# Patient Record
Sex: FEMALE | Race: BLACK OR AFRICAN AMERICAN
[De-identification: names, ages, dates, MRNs, and addresses within clinical notes are randomized per-mention and may not be internally consistent; named-entity substitution may affect disease eponyms.]

---

## 2018-02-18 ENCOUNTER — HOSPITAL ENCOUNTER (EMERGENCY)
Dept: HOSPITAL 62 - ER | Age: 28
Discharge: STILL ADMITTED OR ADMINISTRATIVE | End: 2018-02-18
Payer: MEDICAID

## 2018-02-18 ENCOUNTER — HOSPITAL ENCOUNTER (INPATIENT)
Dept: HOSPITAL 62 - LC | Age: 28
LOS: 3 days | Discharge: HOME | End: 2018-02-21
Attending: OBSTETRICS & GYNECOLOGY | Admitting: OBSTETRICS & GYNECOLOGY
Payer: MEDICAID

## 2018-02-18 VITALS — DIASTOLIC BLOOD PRESSURE: 67 MMHG | SYSTOLIC BLOOD PRESSURE: 129 MMHG

## 2018-02-18 DIAGNOSIS — N85.8: ICD-10-CM

## 2018-02-18 DIAGNOSIS — O16.3: ICD-10-CM

## 2018-02-18 DIAGNOSIS — Z3A.36: ICD-10-CM

## 2018-02-18 DIAGNOSIS — F32.9: ICD-10-CM

## 2018-02-18 DIAGNOSIS — O99.89: ICD-10-CM

## 2018-02-18 DIAGNOSIS — O34.211: Primary | ICD-10-CM

## 2018-02-18 DIAGNOSIS — O99.343: Primary | ICD-10-CM

## 2018-02-18 DIAGNOSIS — N73.6: ICD-10-CM

## 2018-02-18 DIAGNOSIS — Z3A.00: ICD-10-CM

## 2018-02-18 DIAGNOSIS — Z30.2: ICD-10-CM

## 2018-02-18 DIAGNOSIS — J45.909: ICD-10-CM

## 2018-02-18 LAB
A1 MICROGLOB PLACENTAL VAG QL: POSITIVE
ADD MANUAL DIFF: NO
ALBUMIN SERPL-MCNC: 3.9 G/DL (ref 3.5–5)
ALP SERPL-CCNC: 149 U/L (ref 38–126)
ALT SERPL-CCNC: 23 U/L (ref 9–52)
ANION GAP SERPL CALC-SCNC: 11 MMOL/L (ref 5–19)
APAP SERPL-MCNC: < 10 UG/ML (ref 10–30)
APPEARANCE UR: (no result)
APPEARANCE UR: CLEAR
APTT PPP: YELLOW S
APTT PPP: YELLOW S
AST SERPL-CCNC: 17 U/L (ref 14–36)
BARBITURATES UR QL SCN: NEGATIVE
BARBITURATES UR QL SCN: NEGATIVE
BASOPHILS # BLD AUTO: 0 10^3/UL (ref 0–0.2)
BASOPHILS NFR BLD AUTO: 0.2 % (ref 0–2)
BILIRUB DIRECT SERPL-MCNC: 0.3 MG/DL (ref 0–0.4)
BILIRUB SERPL-MCNC: 0.3 MG/DL (ref 0.2–1.3)
BILIRUB UR QL STRIP: NEGATIVE
BILIRUB UR QL STRIP: NEGATIVE
BUN SERPL-MCNC: 7 MG/DL (ref 7–20)
CALCIUM: 9.8 MG/DL (ref 8.4–10.2)
CHLORIDE SERPL-SCNC: 105 MMOL/L (ref 98–107)
CO2 SERPL-SCNC: 21 MMOL/L (ref 22–30)
EOSINOPHIL # BLD AUTO: 0.1 10^3/UL (ref 0–0.6)
EOSINOPHIL NFR BLD AUTO: 0.6 % (ref 0–6)
ERYTHROCYTE [DISTWIDTH] IN BLOOD BY AUTOMATED COUNT: 15.7 % (ref 11.5–14)
ETHANOL SERPL-MCNC: < 10 MG/DL
GLUCOSE SERPL-MCNC: 89 MG/DL (ref 75–110)
GLUCOSE UR STRIP-MCNC: NEGATIVE MG/DL
GLUCOSE UR STRIP-MCNC: NEGATIVE MG/DL
HCT VFR BLD CALC: 36.9 % (ref 36–47)
HGB BLD-MCNC: 12.1 G/DL (ref 12–15.5)
KETONES UR STRIP-MCNC: (no result) MG/DL
KETONES UR STRIP-MCNC: 20 MG/DL
LDH1 SERPL-CCNC: 401 U/L (ref 313–618)
LYMPHOCYTES # BLD AUTO: 2.5 10^3/UL (ref 0.5–4.7)
LYMPHOCYTES NFR BLD AUTO: 26.5 % (ref 13–45)
MCH RBC QN AUTO: 26.6 PG (ref 27–33.4)
MCHC RBC AUTO-ENTMCNC: 32.8 G/DL (ref 32–36)
MCV RBC AUTO: 81 FL (ref 80–97)
METHADONE UR QL SCN: NEGATIVE
METHADONE UR QL SCN: NEGATIVE
MONOCYTES # BLD AUTO: 0.5 10^3/UL (ref 0.1–1.4)
MONOCYTES NFR BLD AUTO: 5.5 % (ref 3–13)
NEUTROPHILS # BLD AUTO: 6.2 10^3/UL (ref 1.7–8.2)
NEUTS SEG NFR BLD AUTO: 67.2 % (ref 42–78)
NITRITE UR QL STRIP: NEGATIVE
NITRITE UR QL STRIP: NEGATIVE
PCP UR QL SCN: NEGATIVE
PCP UR QL SCN: NEGATIVE
PH UR STRIP: 6 [PH] (ref 5–9)
PH UR STRIP: 6 [PH] (ref 5–9)
PLATELET # BLD: 221 10^3/UL (ref 150–450)
POTASSIUM SERPL-SCNC: 3.9 MMOL/L (ref 3.6–5)
PROT SERPL-MCNC: 7 G/DL (ref 6.3–8.2)
PROT UR STRIP-MCNC: 30 MG/DL
PROT UR STRIP-MCNC: 30 MG/DL
RBC # BLD AUTO: 4.55 10^6/UL (ref 3.72–5.28)
RUBELLA INTERPRETATION: POSITIVE
RUBV IGG SER-ACNC: 14 IU/ML
SALICYLATES SERPL-MCNC: < 1 MG/DL (ref 2–20)
SODIUM SERPL-SCNC: 136.5 MMOL/L (ref 137–145)
SP GR UR STRIP: 1.03
SP GR UR STRIP: 1.03
TOTAL CELLS COUNTED % (AUTO): 100 %
URATE SERPL-MCNC: 4.2 MG/DL (ref 2.5–6.2)
URINE AMPHETAMINES SCREEN: NEGATIVE
URINE AMPHETAMINES SCREEN: NEGATIVE
URINE BENZODIAZEPINES SCREEN: NEGATIVE
URINE BENZODIAZEPINES SCREEN: NEGATIVE
URINE COCAINE SCREEN: NEGATIVE
URINE COCAINE SCREEN: NEGATIVE
URINE MARIJUANA (THC) SCREEN: NEGATIVE
URINE MARIJUANA (THC) SCREEN: NEGATIVE
UROBILINOGEN UR-MCNC: NEGATIVE MG/DL (ref ?–2)
UROBILINOGEN UR-MCNC: NEGATIVE MG/DL (ref ?–2)
WBC # BLD AUTO: 9.3 10^3/UL (ref 4–10.5)

## 2018-02-18 PROCEDURE — 99284 EMERGENCY DEPT VISIT MOD MDM: CPT

## 2018-02-18 PROCEDURE — 93005 ELECTROCARDIOGRAM TRACING: CPT

## 2018-02-18 PROCEDURE — 87340 HEPATITIS B SURFACE AG IA: CPT

## 2018-02-18 PROCEDURE — 86592 SYPHILIS TEST NON-TREP QUAL: CPT

## 2018-02-18 PROCEDURE — 81001 URINALYSIS AUTO W/SCOPE: CPT

## 2018-02-18 PROCEDURE — 80053 COMPREHEN METABOLIC PANEL: CPT

## 2018-02-18 PROCEDURE — 80307 DRUG TEST PRSMV CHEM ANLYZR: CPT

## 2018-02-18 PROCEDURE — 94799 UNLISTED PULMONARY SVC/PX: CPT

## 2018-02-18 PROCEDURE — 86901 BLOOD TYPING SEROLOGIC RH(D): CPT

## 2018-02-18 PROCEDURE — 88307 TISSUE EXAM BY PATHOLOGIST: CPT

## 2018-02-18 PROCEDURE — 84443 ASSAY THYROID STIM HORMONE: CPT

## 2018-02-18 PROCEDURE — 87591 N.GONORRHOEAE DNA AMP PROB: CPT

## 2018-02-18 PROCEDURE — 85027 COMPLETE CBC AUTOMATED: CPT

## 2018-02-18 PROCEDURE — 81005 URINALYSIS: CPT

## 2018-02-18 PROCEDURE — C1765 ADHESION BARRIER: HCPCS

## 2018-02-18 PROCEDURE — 36415 COLL VENOUS BLD VENIPUNCTURE: CPT

## 2018-02-18 PROCEDURE — 84112 EVAL AMNIOTIC FLUID PROTEIN: CPT

## 2018-02-18 PROCEDURE — 83615 LACTATE (LD) (LDH) ENZYME: CPT

## 2018-02-18 PROCEDURE — 93010 ELECTROCARDIOGRAM REPORT: CPT

## 2018-02-18 PROCEDURE — 86762 RUBELLA ANTIBODY: CPT

## 2018-02-18 PROCEDURE — 86804 HEP C AB TEST CONFIRM: CPT

## 2018-02-18 PROCEDURE — 86701 HIV-1ANTIBODY: CPT

## 2018-02-18 PROCEDURE — 0U570ZZ DESTRUCTION OF BILATERAL FALLOPIAN TUBES, OPEN APPROACH: ICD-10-PCS | Performed by: OBSTETRICS & GYNECOLOGY

## 2018-02-18 PROCEDURE — 84550 ASSAY OF BLOOD/URIC ACID: CPT

## 2018-02-18 PROCEDURE — 86850 RBC ANTIBODY SCREEN: CPT

## 2018-02-18 PROCEDURE — 87491 CHLMYD TRACH DNA AMP PROBE: CPT

## 2018-02-18 PROCEDURE — 85025 COMPLETE CBC W/AUTO DIFF WBC: CPT

## 2018-02-18 PROCEDURE — 4A1HXCZ MONITORING OF PRODUCTS OF CONCEPTION, CARDIAC RATE, EXTERNAL APPROACH: ICD-10-PCS | Performed by: OBSTETRICS & GYNECOLOGY

## 2018-02-18 PROCEDURE — 86803 HEPATITIS C AB TEST: CPT

## 2018-02-18 PROCEDURE — 76815 OB US LIMITED FETUS(S): CPT

## 2018-02-18 PROCEDURE — 86900 BLOOD TYPING SEROLOGIC ABO: CPT

## 2018-02-18 NOTE — WARNING SIGNS IN BABIES
=================================================================

VOD Warning Signs

=================================================================

Datetime Report Generated by [facility]: 02/18/2018 23:14

   

VOD#608 -Warning Signs in Babies:  Needs to be viewed.    (02/18/2018

   19:24:Yumiko Giordano RN)

## 2018-02-18 NOTE — ER DOCUMENT REPORT
ED Psych Disorder / Suicide





- General


Chief Complaint: Psych Problem


Stated Complaint: PSYCH EVAL


Time Seen by Provider: 18 17:13


Mode of Arrival: Ambulatory


TRAVEL OUTSIDE OF THE [place] IN LAST 30 DAYS: No





- HPI


Patient complains to provider of: Other - DEPRESSION


Onset: This afternoon


Onset was: Sudden


Quality of pain: No pain


Suicide Risk Factors: Lack of social support


Situational problems related to: Spouse, Other - PREGNANCY


Normal mood: No - TEARFUL, DEPRESSED


Associated symptoms: Depressed, Tearful


Similar symptoms previously: Yes


Recently seen / treated by doctor: Yes - ROUTINE PRENATAL CARE


Notes: 





This patient apparently was at this emergency department earlier today and 

dropped off her 14-month-old child unattended and then left.  Subsequently she 

returned and requested mental health assistance.





- Related Data


Allergies/Adverse Reactions: 


 





No Known Allergies Allergy (Unverified 18 16:49)


 











Past Medical History





- General


Information source: Patient





- Social History


Smoking Status: Never Smoker


Chew tobacco use (# tins/day): No


Frequency of alcohol use: None


Drug Abuse: None


Lives with: Parents


Family History: None


Patient has suicidal ideation: No


Patient has homicidal ideation: No





- Past Medical History


Cardiac Medical History: Reports: Hx Hypertension


Pulmonary Medical History: Reports: None


EENT Medical History: Reports: None


Neurological Medical History: Reports: None


Endocrine Medical History: Reports: None


Renal/ Medical History: Reports: None.  Denies: Hx Peritoneal Dialysis


Malignancy Medical History: Reports: None


GI Medical History: Reports: None


Musculoskeltal Medical History: Reports None


Psychiatric Medical History: Reports: Hx Depression


Past Surgical History: Reports: Hx  Section





Review of Systems





- Review of Systems


Constitutional: No symptoms reported


EENT: No symptoms reported


Cardiovascular: No symptoms reported


Respiratory: No symptoms reported


Gastrointestinal: No symptoms reported


Genitourinary: No symptoms reported


Female Genitourinary: See HPI.  denies: Vaginal bleeding


Musculoskeletal: No symptoms reported


Skin: No symptoms reported


Neurological/Psychological: No symptoms reported





Physical Exam





- Vital signs


Vitals: 


 











Temp Pulse Resp BP Pulse Ox


 


 97.8 F   82   20   129/67 H  100 


 


 18 17:01  18 17:01  18 17:01  18 17:01  18 17:01











Interpretation: Normal





- General


General appearance: Appears well, Alert


In distress: None





- HEENT


Head: Normocephalic


Eyes: Normal


Conjunctiva: Normal


Ears: Normal


Nasal: Normal


Mouth/Lips: Normal


Mucous membranes: Normal


Pharynx: Normal





- Respiratory


Respiratory status: No respiratory distress


Breath sounds: Normal





- Cardiovascular


Rhythm: Regular


Heart sounds: Normal auscultation


Murmur: No





- Abdominal


Inspection: Gravid female





- Back


Back: Normal





- Extremities


General upper extremity: Normal inspection


General lower extremity: Normal inspection





- Neurological


Neuro grossly intact: Yes


Cognition: Normal


Orientation: AAOx4





- Psychological


Associated symptoms: Depressed, Tearful





- Skin


Skin Temperature: Warm


Skin Moisture: Dry


Skin Color: Normal


Skin Turgor: Elastic





Course





- Vital Signs


Vital signs: 


 











Temp Pulse Resp BP Pulse Ox


 


 97.8 F   82   20   129/67 H  100 


 


 18 17:01  18 17:01  18 17:01  18 17:01  18 17:01














- Laboratory


Result Diagrams: 


 18 17:50





 18 17:50


Laboratory results interpreted by me: 


 











  18





  17:50 17:50


 


MCH  26.6 L 


 


RDW  15.7 H 


 


Sodium   136.5 L


 


Carbon Dioxide   21 L


 


Alkaline Phosphatase   149 H


 


Salicylates   < 1.0 L


 


Acetaminophen   < 10 L














Discharge





- Discharge


Clinical Impression: 


 Third trimester pregnancy





Depression


Qualifiers:


 Depression Type: unspecified Qualified Code(s): F32.9 - Major depressive 

disorder, single episode, unspecified





Condition: Stable


Disposition: LABOR CHECK

## 2018-02-18 NOTE — RADIOLOGY REPORT (SQ)
EXAM DESCRIPTION:  U/S OB LIMITED



COMPLETED DATE/TIME:  2/18/2018 10:15 pm



REASON FOR STUDY:  unknown PNC, ?ED 3/15, EGA



COMPARISON:  None.



TECHNIQUE:  Limited transabdominal grayscale ultrasound for evaluation of specific requested obstetri
vanessa parameters.



LIMITATIONS:  None.



FINDINGS:  CECE: 8.5 cm.  Largest pocket 4.6 cm.

FHR: 113 beats per minute.

PRESENTATION: Cephalic.

OTHER: Posterior placenta without evidence of abruption.  Estimated fetal weight 3,228 g +/-4978 g.



IMPRESSION:  LIMITED OBSTETRICAL ULTRASOUND WITH MEASURED PARAMETERS DELINEATED ABOVE.

Trimester of pregnancy: Third trimester - 28 weeks to delivery.



TECHNICAL DOCUMENTATION:  JOB ID:  [id number]

 2011 [employer]- All Rights Reserved

## 2018-02-19 LAB
CHLAM PCR: NOT DETECTED
ERYTHROCYTE [DISTWIDTH] IN BLOOD BY AUTOMATED COUNT: 15.8 % (ref 11.5–14)
GON PCR: NOT DETECTED
HCT VFR BLD CALC: 31.2 % (ref 36–47)
HGB BLD-MCNC: 10.3 G/DL (ref 12–15.5)
MCH RBC QN AUTO: 26.7 PG (ref 27–33.4)
MCHC RBC AUTO-ENTMCNC: 32.9 G/DL (ref 32–36)
MCV RBC AUTO: 81 FL (ref 80–97)
PLATELET # BLD: 181 10^3/UL (ref 150–450)
RBC # BLD AUTO: 3.84 10^6/UL (ref 3.72–5.28)
WBC # BLD AUTO: 10.2 10^3/UL (ref 4–10.5)

## 2018-02-19 RX ADMIN — Medication SCH CAP: at 10:35

## 2018-02-19 RX ADMIN — IBUPROFEN SCH MG: 800 TABLET, FILM COATED ORAL at 02:28

## 2018-02-19 RX ADMIN — HYDROMORPHONE HYDROCHLORIDE PRN MG: 2 INJECTION INTRAMUSCULAR; INTRAVENOUS; SUBCUTANEOUS at 01:24

## 2018-02-19 RX ADMIN — KETOROLAC TROMETHAMINE SCH MG: 30 INJECTION, SOLUTION INTRAMUSCULAR at 10:36

## 2018-02-19 RX ADMIN — KETOROLAC TROMETHAMINE SCH MG: 30 INJECTION, SOLUTION INTRAMUSCULAR at 17:12

## 2018-02-19 RX ADMIN — DOCUSATE SODIUM SCH MG: 100 CAPSULE, LIQUID FILLED ORAL at 10:35

## 2018-02-19 RX ADMIN — IBUPROFEN SCH MG: 800 TABLET, FILM COATED ORAL at 11:00

## 2018-02-19 RX ADMIN — OXYCODONE AND ACETAMINOPHEN PRN TAB: 5; 325 TABLET ORAL at 21:26

## 2018-02-19 RX ADMIN — DOCUSATE SODIUM SCH MG: 100 CAPSULE, LIQUID FILLED ORAL at 17:12

## 2018-02-19 RX ADMIN — KETOROLAC TROMETHAMINE SCH MG: 30 INJECTION, SOLUTION INTRAMUSCULAR at 21:12

## 2018-02-19 RX ADMIN — IBUPROFEN SCH MG: 800 TABLET, FILM COATED ORAL at 06:16

## 2018-02-19 RX ADMIN — IBUPROFEN SCH MG: 800 TABLET, FILM COATED ORAL at 17:03

## 2018-02-19 RX ADMIN — HYDROMORPHONE HYDROCHLORIDE PRN MG: 2 INJECTION INTRAMUSCULAR; INTRAVENOUS; SUBCUTANEOUS at 06:50

## 2018-02-19 NOTE — DELIVERY SUMMARY
=================================================================

Del Sum A-C

=================================================================

Datetime Report Generated by CPN: 2018 02:12

   

   

=================================================================

DELIVERY PERSONNEL

=================================================================

   

DELIVERY PERSONNEL:  [id number]

Delivery Doctor::  Anamaria Ibanez MD

Anesthesiologist::  Jennifer Alicia MD

CRNA::  Antoni Castillo CRNA

Labor and Delivery Nurse::  Yumiko Giordano RN

Circulator::  Yumiko Giordano RN

 Nurse Practitioner::  VIOLETA Guillen

Nursery Nurse::  Isis Mercado RN

Scrub Tech/CNA:  ST Elaine

Scrub Tech/CNA:  Vira Mccarthy ST

   

=================================================================

MATERNAL INFORMATION

=================================================================

   

Delivery Anesthesia:  Spinal

Medications After Delivery:  Pitocin Drip 20 Units/1000ml NSS

Estimated  Blood Loss (ml):  600

Maternal Complications:  Premature Rupture of Membranes; Other

Other Maternal Complications:  ghtn, psychosocial issues, limited

   prenatal records receives care in [place], [place]

   

=================================================================

LABOR SUMMARY

=================================================================

   

EDC:  03/15/2018 00:00

No. Babies in Womb:  1

 Attempted:  No

Labor Anesthesia:  None

   

=================================================================

LABOR INFORMATION

=================================================================

   

Reason for Induction:  Not Applicable

Oxytocin:  N/A

Group B Beta Strep:  pos

Antibiotics # of Doses:  1

Name of Antibiotic Given:  penicillin

Steroids Given:  None

Reason Steroids Not Administered:  Not Applicable

   

=================================================================

MEMBRANES

=================================================================

   

Membranes Rupture Method:  Spontaneous

Rupture of Membranes:  2018 16:00

Length of Rupture (hr):  6.40

Amniotic Fluid Color:  Clear

Amniotic Fluid Amount:  Scant

Amniotic Fluid Odor:  Normal

   

=================================================================

STAGES OF LABOR

=================================================================

   

Stage 3 hr:  0

Stage 3 min:  1

   

=================================================================

VAGINAL DELIVERY

=================================================================

   

Episiotomy:  None

Laceration #1:  None

Laceration Extension #1:  N/A

Laceration Repair:  Not Applicable

   

=================================================================

CSECTION DELIVERY

=================================================================

   

Primary Indication:  > 2 Previous C-Sections

CSection Urgency:  Non-Scheduled

CSection Incidence:  Repeat

Labor:  No Labor

Elective:  Nonelective

CSection Incision:  Lower Uterine Transverse

   

=================================================================

BABY A INFORMATION

=================================================================

   

Infant Delivery Date/Time:  2018 22:24

Method of Delivery:  

Born in Route :  No

:  N/A

Forceps:  N/A

Vacuum Extraction:  N/A

Shoulder Dystocia :  No

   

=================================================================

PRESENTATION/POSITION BABY A

=================================================================

   

Presentation:  Cephalic

Cephalic Presentation:  Vertex

Breech Presentation:  N/A

   

=================================================================

PLACENTA INFORMATION BABY A

=================================================================

   

Placenta Delivery Time :  2018 22:25

Placenta Method of Delivery:  Manual Removal

Placenta Status:  Delivered

   

=================================================================

APGAR SCORES BABY A

=================================================================

   

Heart Rate 1 min:  >100 bpm

Resp Effort 1 min:  Good Cry

Reflex Irritability 1 min:  Cough or Sneeze or Pulls Away

Muscle Tone 1 min:  Active Motion

Color 1 min:  Blue/Pale

Resuscitation Effort 1 min:  Tactile Stimulation

APGAR SCORE 1 MIN:  8

Heart Rate 5 min:  >100 bpm

Resp Effort 5 min:  Good Cry

Reflex Irritability 5 min:  Cough or Sneeze or Pulls Away

Muscle Tone 5 min:  Active Motion

Color 5 min:  Body Pink, Extremities Blue

Resuscitation Effort 5 min:  Tactile Stimulation

APGAR SCORE 5 MIN:  9

   

=================================================================

INFANT INFORMATION BABY A

=================================================================

   

Gestational Age at Delivery:  36.3

Gestational Status:  Late - 34- 36.6 Weeks

Infant Outcome :  Liveborn

Infant Condition :  Stable

Infant Sex:  Male

   

=================================================================

IDENTIFICATION BABY A

=================================================================

   

Infant Verification Date/Time:  2018 22:42

ID Band Number:  [id number]

Mother's Name Verified:  Yes

Infant Medical Record Number:  [id number]

RN Verifying Infant:  rn magali/rn leslie

Additional Verifying Personnel:  rn mercado

   

=================================================================

WEIGHT/LENGTH BABY A

=================================================================

   

Infant Birthweight (gm):  2635

Infant Weight (lb):  5

Infant Weight (oz):  13

Infant Length (in):  18.50

Infant Length (cm):  46.99

   

=================================================================

CORD INFORMATION BABY A

=================================================================

   

No. Cord Vessels:  3

Nuchal Cord :  Around Neck x1, Loose

Nuchal Cord- Other:  body cord

Cord Blood Taken:  Yes-For Storage (Mom's Blood type +)

Infant Suction:  Mouth

   

=================================================================

ASSESSMENT BABY A

=================================================================

   

Infant Complications:  None

Skin to Skin:  No

   

=================================================================

BABY B INFORMATION

=================================================================

   

 :  N/A

## 2018-02-19 NOTE — ADMISSION PHYSICAL
=================================================================



=================================================================

Datetime Report Generated by CPN: 02/19/2018 01:36

   

   

=================================================================

OBSTETRICAL HISTORY

=================================================================

   

Current Pregnancy Procedures:  Ultrasound; NST

Obstetrical History Comments:  G1: 37 week fetal distress

   G2: 35 week, pre-e, repeat c/s

   G3: current, IUGR?, chtn 

   

=================================================================

MEDICAL HISTORY

=================================================================

   

Medical History Comments:  abn pap-, cone biopsy? during pregnancy G2,

   pt denies mental health hx, states she finished keflex for UTI 2/16,

   asthma with rescue inhaler 

   

=================================================================

INFECTIOUS HISTORY

=================================================================

   

Chlamydia:  Yes

   

=================================================================

FETUS A

=================================================================

   

EGA:  36.3

## 2018-02-19 NOTE — BRIEF OPERATIVE NOTE
BRIEF OPERATIVE REPORT


DATE OF SURGERY: 18


TIME OF SURGERY: 23:15


PREOPERATIVE DIAGNOSIS: H/o  section x 2, Undesired Fertiltiy, Morbid 

Obesity, 36+3ega, CHTN, Keloid scar


POSTOPERATIVE DIAGNOSIS: ANDREI - delivered


SURGEON: DAVID LATIF


FINDINGS: VMI delivered at 2224, Apgars 8/9, multiple adhesions from the 

anterior uterine body to the anterior abdominal wall.  Normal bilateral tubes/

ovaries.  Filsche clips x 2 placed on bilateral fallopian tubes.  Interceed and 

Surgicel placed.


COMPLICATIONS: 


None


ESTIMATED BLOOD LOSS: 600


TISSUE REMOVED OR ALTERED: placenta and cord sent to pathology


TECHNICAL PROCEDURE: Repeat Low Transverse  Section, Bilateral Tubal 

Ligation, Scar Revision

## 2018-02-19 NOTE — OPERATIVE REPORT
Operative Report


DATE OF SURGERY: 18


PREOPERATIVE DIAGNOSIS: H/o  section x 2, Undesired Fertiltiy, Morbid 

Obesity, 36+3ega, CHTN, Keloid scar


POSTOPERATIVE DIAGNOSIS: ANDREI - delivered


OPERATION: Repeat Low Transverse  Section, Bilateral Tubal Ligation, 

Scar Revision


SURGEON: DAVID LATIF


ANESTHESIA: GA


TISSUE REMOVED OR ALTERED: placenta and cord sent to pathology


COMPLICATIONS: 


None


ESTIMATED BLOOD LOSS: 600


INTRAOPERATIVE FINDINGS: VMI delivered at 2224, Apgars 8/9, multiple adhesions 

from the anterior uterine body to the anterior abdominal wall.  Normal 

bilateral tubes/ovaries.  Filsche clips x 2 placed on bilateral fallopian 

tubes.  Interceed and Surgicel placed.


PROCEDURE: 


Anesthesia provider: [Jennifer Alicia MD, Lennox Donaldson CRNA]





Estimated blood loss: [600ml]





Urine output: [100ml]





IV fluids: [1000ml]





Indications: [28yo  at 36+6ega [place] presented to 

Labor and delivery with complaint of rupture of membranes at approximately 

1600.  She reportedly wanted the FOB to care for the 14 month old they have 

together and when he would not agree to care for child, she left the child at 

the ER.  She then returned with her 5 yo and was seen in the ER and mental 

status cleared and sent to labor and delivery for evaluation.  History obtained 

and records requested.  Spoke with Dr. Carpenter at her practice and GBS positive 

and noted that he would fax the [facility] consent to us.  Per limited records BRUCE 

appears to be 3/15/2018 and BTL consent was signed 1/3/2018. Per patient report 

and records and exam she has had 2 prior  sections.  Pregnancy c/b HTN, 

Morbid Obesity, Headache Syndromes, Asthma.  Consent reviewed with patient for 

Repeat  section and bilateral tubal ligation.  THe risks/benefits/

alternatives were reviewed and she desires to proceed with planned procedure.]





Procedure: The patient was taken to the operating room where spinal anesthesia 

was obtained and found to be adequate.  She was then prepped and draped in the 

normal sterile fashion and placed in the dorsal supine position with a leftward 

tilt.  A Pfannenstiel skin incision was then made and carried through to the 

underlying layers of the fascia with the scalpel after excising prior keloid 

scar.  The fascia was incised in the midline and the incision extended 

laterally with the Cardoso scissors.  The superior aspect of the fascial incision 

was then grasped with Kocher clamps elevated and the underlying rectus muscles 

dissected off  sharply due to adhesions of the uterus to the anterior abdominal 

wall.  Attention was then turned to the inferior aspect of the fascial incision 

which in a similar fashion was grasped, tented up with Kocher clamps, and the 

rectus muscles dissected off sharply.  The rectus muscles were then  

in the midline and the peritoneum at the amount identified and entered [bluntly]

.  The peritoneal incision was then extended superiorly and inferiorly with 

good visualization of the bladder.  The bladder blade was inserted and the 

vesicouterine peritoneum identified grasped with [place] pickups and entered 

sharply with the Metzenbaum scissors.  This incision was then extended 

laterally with the Metzenbaum scissors and a bladder flap created digitally.  

The bladder blade was then reinserted and the lower uterine segment incised in 

a transverse fashion with the scalpel.  The uterine incision was then extended 

bluntly.  The bladder blade was removed and the infant's head was delivered 

from cephalic presentation atraumatically.  The nose and mouth were suctioned 

and the cord doubly clamped and cut.  And the infant was handed off to waiting 

pediatricians.





The placenta was then delivered spontaneously and the uterus exteriorized and 

cleared of all clots and debris.  The uterine incision was then repaired with 1-

0 Vicryl in a running locked fashion.  A second layer of the same suture was 

used to obtain hemostasis via imbrication of the initial layer.  The bladder 

flap was then repaired with 3-0 chromic in a running fashion.  The uterus was 

returned to the patient's abdomen and Interceed was placed overlying the 

uterine incision to prevent adhesions.  Surgicel placed to help with hemostasis 

over the sites of uterine adhesiolysis to anterior abdominal wall.  The gutters 

were cleared of all clots and debris.    The left fallopian tube was identified 

and carried out to the fimbriated end and the mid ampullary portion was ligated 

with Filschie clip times two.  This was repeated on the patients right 

fallopian tube thus completing Bilateral tubal occlusion.  All operative sites 

were noted to be hemostatic.  The fascia was reapproximated with 0 Vicryl in a 

running fashion from each lateral edge to the midline.  The skin was closed 

with 3-0 Monocryl in a running subcuticular fashion with overlying Dermabond 

for additional dressing as well as wound closure.  The patient tolerated the 

procedure well.  Sponge lap needle and instrument counts are correct timw 2.  3 

g of Ancef were given prior to skin incision.  The patient was taken to the 

recovery area awake and in stable condition.

## 2018-02-19 NOTE — PDOC PROGRESS REPORT
Subjective-OB


Progress Note for:: 18


Subjective: 





pt has no complaints








Physical Exam (OB)


Vital Signs: 


 











Temp Pulse Resp BP Pulse Ox


 


 97.7 F   88   16   122/60   100 


 


 18 08:19  18 08:19  18 08:19  18 08:19  18 08:19








 Intake & Output











 18





 06:59 06:59 06:59


 


Intake Total  950 


 


Output Total  200 


 


Balance  750 


 


Weight  114.6 kg 














- PIH/Pre-Eclampsia


DTR's: 2 +


Clonus: Negative


Headache: Absent


Epigastric Pain: No


Visual Changes: No





- 


Incision: Well Approximated


Closure Type: Sutures





- Lochia


Lochia Amount: Scant < 10 ml


Lochia Color: Rubra/Red





- Abdomen


Description: Tender, Soft


Hernia Present: No


Bowel Sounds: Normoactive


Flatus Presence: Absent


Stool: No


Fundal Description: Firm, Midline


Fundal Height: u/u - u/2





- Respiratory


Breath sounds: Clear





- Extremities


Calf: Normal





Objective-Diagnostic


Laboratory: 


 





 18 07:03 





 











  18





  19:15 21:35 21:35


 


WBC   


 


RBC   


 


Hgb   


 


Hct   


 


MCV   


 


MCH   


 


MCHC   


 


RDW   


 


Plt Count   


 


Uric Acid    4.2


 


TSH   


 


Urine Color  YELLOW  


 


Urine Appearance  SLIGHTLY-CLOUDY  


 


Urine pH  6.0  


 


Ur Specific Gravity  1.034  


 


Urine Protein  30 H  


 


Urine Glucose (UA)  NEGATIVE  


 


Urine Ketones  TRACE H  


 


Urine Blood  NEGATIVE  


 


Urine Nitrite  NEGATIVE  


 


Ur Leukocyte Esterase  NEGATIVE  


 


Blood Type   A POSITIVE 


 


Antibody Screen   NEGATIVE 














  18





  21:35 07:03


 


WBC   10.2


 


RBC   3.84


 


Hgb   10.3 L


 


Hct   31.2 L


 


MCV   81


 


MCH   26.7 L


 


MCHC   32.9


 


RDW   15.8 H


 


Plt Count   181


 


Uric Acid  


 


TSH  2.06 


 


Urine Color  


 


Urine Appearance  


 


Urine pH  


 


Ur Specific Gravity  


 


Urine Protein  


 


Urine Glucose (UA)  


 


Urine Ketones  


 


Urine Blood  


 


Urine Nitrite  


 


Ur Leukocyte Esterase  


 


Blood Type  


 


Antibody Screen

## 2018-02-19 NOTE — ADMISSION PHYSICAL
=================================================================



=================================================================

Datetime Report Generated by CPN: 2018 08:04

   

   

=================================================================

CURRENT ADMISSION

=================================================================

   

Chief Complaint:  Suspected Ruptured Membranes

Indication for Induction:  Not Applicable

Indication for Induction:  , Intrauterine Pregnancy; No Active

   Labor; Ruptured Membranes

Admit Plan:  Admit to Unit; Initiate  Section Protocol

   

=================================================================

ALLERGIES

=================================================================

   

Medication Allergies:  No

Medication Allergies:  No Known Allergies (2018)

Latex:  Unknown

   

=================================================================

OBSTETRICAL HISTORY

=================================================================

   

EDC:  03/15/2018 00:00

:  3

Para:  2

Term:  1

:  1

SAB:  0

IAB:  0

Livin

Cesareans:  2

VBACs:  0

Gestational Diabetes:  No

Rh Sensitization:  No

Incompetent Cervix:  No

SHAHLA:  No

Infertility:  No

ART Treatment:  No

Uterine Anomaly:  No

IUGR:  Yes

Hx Previous C/S:  Yes

Macrosomia:  No

Hx Loss/Stillborn:  No

PIH:  Yes

Hx  Death:  No

Placenta Previa/Abruption:  No

Depression/PP Depression:  No

PTL/PROM:  Yes

Post Partum Hemorrhage:  No

Current Pregnancy Procedures:  Ultrasound; NST

Obstetrical History Comments:  G1: 37 week fetal distress

   G2: 35 week, pre-e, repeat c/s

   G3: current, IUGR?, chtn 

   

=================================================================

***SEE PRENATAL RECORDS***

=================================================================

   

Alcohol:  No

Marijuana :  No

Cocaine:  No

Other Illicit Drugs:  No

Cigarettes:  Never Smoker. [id number]

   

=================================================================

MEDICAL HISTORY

=================================================================

   

Diabetes:  No

Blood Transfusion:  No

Pulmonary Disease (Asthma, TB):  Yes

Breast Disease:  No

Hypertension:  Yes

Gyn Surgery:  No

Heart Disease:  No

Hosp/Surgery:  No

Autoimmune Disorder:  No

Anesthetic Complications:  No

Kidney Disease:  Yes

Abnormal Pap Smear:  Yes

Neuro/Epilepsy:  No

Psychiatric Disorders:  No

Other Medical Diseases:  No

Hepatitis/Liver Disease:  No

Significant Family History:  No

Varicosities/Phlebitis:  No

Trauma/Violence :  Yes

Thyroid Dysfunction:  No

Medical History Comments:  abn pap-, cone biopsy? during pregnancy G2,

   pt denies mental health hx, states she finished keflex for UTI ,

   asthma with rescue inhaler 

   

=================================================================

INFECTIOUS HISTORY

=================================================================

   

Gonorrhea:  No

Genital Herpes:  No

Chlamydia:  Yes

Tuberculosis:  No

Syphilis:  No

Hepatitis:  No

HIV/AIDS Exposure:  No

Rash or Viral Illness:  No

HPV:  No

Infectious History Comments:  chlamydia 

   

=================================================================

PHYSICAL EXAM

=================================================================

   

General:  Normal

HEENT:  Normal

Neurologic:  Normal

Thyroid:  Deferred

Heart:  Normal

Lungs:  Normal

Breast:  Deferred

Back:  Normal

Abdomen:  Normal

Genitourinary Exam:  Normal

Extremities:  Normal

DTRs:  Normal

Pelvic Type:  Adequate

Vital Signs:  Reviewed; Within Normal Limits

   

=================================================================

VAGINAL EXAM

=================================================================

   

Dilatation:  1

Effacement:  25

Station:  -3

Contraction Comments:  irreg

   

=================================================================

MEMBRANES

=================================================================

   

Pooling:  Positive

Membranes:  Ruptured

Amniotic Fluid Color:  Clear

   

=================================================================

FETUS A

=================================================================

   

EGA:  36.3

Monitoring:  External US

FHR- Baseline:  135

Variability:  Moderate 6-25bpm

Accelerations:  15X15

Decelerations:  None

FHR Category:  Category I

Fetal Presentation:  Vertex

Admit Comment:  28yo  at 36+3ega presents after surrendering her

   14month old at the ER downstairs stating she was leaking fluid.  H/o

   C/S x 2.  Pt poor historian but reports poss PreE and delivery

    at 35wks with prior preg.  Records rec'd but incomplete. 

   Only partial records.  BTL signed consent on 1/3/2018 (per records)

   and Provider from her practice will send copy of consent.  H/o CHTN

   and IUGR.  LMP per records  but BRUCE per records is 3/15/2018 

   (not cosistent) - appears she may have been late to care.  Pregnancy

   also c/b GBS pos in Urine (PCN given now), CHTN (no meds), Asthma,

   Morbid Obesity (BMI 44), HA syndromes, Chlamydia.  Recent US on 

   noted EFW 4#15oz.  S/w Dr. Alex Carpenter at [facility] practice.  Of

   note - no labs in records - so No PNC labs done.  Will notify

   Anesthesia OR team.  Will notify NICU as well.  CPS involved -

   Discharge planning consult placed.

   

=================================================================

PLANS FOR LABOR AND DELIVERY

=================================================================

   

Labor and Delivery:  None

Pain Management:  Spinal

Feeding Preference:  Both

Benefit of Breast Feed Discussed:  Yes

Circumcision:  No

   

=================================================================

INFORMED CONSENT

=================================================================

   

Informed Consent Obtained:  Vaginal Delivery;  Section

   Delivery; Risks, Benefits and Alternatives Discussed

Signature:  Electronically signed by Anamaria Ibanez MD ([email or web address]) on

   2018 at 21:15  with User ID: [email or web address]

## 2018-02-20 LAB — HCV AB SER IA-ACNC: <0.1 S/CO RATIO (ref 0–0.9)

## 2018-02-20 RX ADMIN — IBUPROFEN SCH MG: 800 TABLET, FILM COATED ORAL at 00:08

## 2018-02-20 RX ADMIN — IBUPROFEN SCH MG: 800 TABLET, FILM COATED ORAL at 17:16

## 2018-02-20 RX ADMIN — KETOROLAC TROMETHAMINE SCH MG: 30 INJECTION, SOLUTION INTRAMUSCULAR at 17:18

## 2018-02-20 RX ADMIN — DOCUSATE SODIUM SCH MG: 100 CAPSULE, LIQUID FILLED ORAL at 10:02

## 2018-02-20 RX ADMIN — Medication SCH CAP: at 10:01

## 2018-02-20 RX ADMIN — IBUPROFEN SCH MG: 800 TABLET, FILM COATED ORAL at 05:51

## 2018-02-20 RX ADMIN — KETOROLAC TROMETHAMINE SCH MG: 30 INJECTION, SOLUTION INTRAMUSCULAR at 10:34

## 2018-02-20 RX ADMIN — OXYCODONE AND ACETAMINOPHEN PRN TAB: 5; 325 TABLET ORAL at 14:42

## 2018-02-20 RX ADMIN — IBUPROFEN SCH MG: 800 TABLET, FILM COATED ORAL at 13:22

## 2018-02-20 RX ADMIN — DOCUSATE SODIUM SCH MG: 100 CAPSULE, LIQUID FILLED ORAL at 17:16

## 2018-02-20 NOTE — PDOC DISCHARGE SUMMARY
Final Diagnosis


Discharge Date: 18





- Final Diagnosis


(1) Asthma


Is this a current diagnosis for this admission?: Yes   





(2) Carrier of group B Streptococcus


Is this a current diagnosis for this admission?: Yes   





(3)  delivery, delivered, current hospitalization


Is this a current diagnosis for this admission?: Yes   





(4) Chronic hypertension affecting pregnancy


Is this a current diagnosis for this admission?: Yes   





(5) H/O  section


Is this a current diagnosis for this admission?: Yes   





(6) Morbid obesity


Is this a current diagnosis for this admission?: Yes   





(7) Premature rupture of membranes in third trimester


Is this a current diagnosis for this admission?: Yes   





(8) Sterilization


Is this a current diagnosis for this admission?: Yes   





(9) Depression


Is this a current diagnosis for this admission?: Yes   





(10) Third trimester pregnancy


Is this a current diagnosis for this admission?: Yes   





Discharge Data





- Discharge Medication


Prescriptions: 


Oxycodone HCl/Acetaminophen [Percocet 5-325 mg Tablet] 2 tab PO Q4HP PRN #30 

tablet


 PRN Reason: 


Docusate Sodium [Colace 100 mg Capsule] 100 mg PO BID #60 capsule


Ferrous Sulfate 325 mg PO BID #60 tablet


Ibuprofen [Motrin 800 mg Tablet] 800 mg PO Q6 #60 tablet


Home Medications: 








Prenatal Vit/Iron Fum/Folic AC [Prenatal Tablet] 1 tab PO DAILY 18 


Docusate Sodium [Colace 100 mg Capsule] 100 mg PO BID #60 capsule 18 


Ferrous Sulfate 325 mg PO BID #60 tablet 18 


Ibuprofen [Motrin 800 mg Tablet] 800 mg PO Q6 #60 tablet 18 


Oxycodone HCl/Acetaminophen [Percocet 5-325 mg Tablet] 2 tab PO Q4HP PRN #30 

tablet 18 








Gestational Age: 36.6


Reason(s) for Admission: Onset of Labor, Ceasarean Section-Repeat, PROM, Group 

B Strep Positive


Prenatal Procedures: NST


Intrapartum Procedure(s): : Low Cervical, Transverse, Tubal Ligation





-  Data


  ** Baby 1 Male


Apgar at 1 minute: 8


Apgar at 5 minutes: 9


Home with Mother: No - undecided at this keyur


Complications: Yes - pt surrendered 14 month old at ER, psych eval





- Diagnosis Test


Laboratory: 


 











Temp Pulse Resp BP Pulse Ox


 


 97.8 F   75   16   110/56 L  99 


 


 18 04:06  18 04:06  18 04:06  18 04:06  18 04:06








 











  18





  19:15 07:03


 


RBC   3.84


 


Hgb   10.3 L


 


Hct   31.2 L


 


Urine Opiates Screen  NEGATIVE 














- Discharge information/Instructions


Discharge Activity: Activity As Tolerated, No Driving, No Lifting Over 10 Pounds

, Pelvic Rest, No tub bath


Discharge Diet: Regular


Disposition: HOME, SELF-CARE


Follow up with: [facility]


in: 1, Weeks - pt states she is following up in [facility]

## 2018-02-21 VITALS — DIASTOLIC BLOOD PRESSURE: 78 MMHG | SYSTOLIC BLOOD PRESSURE: 138 MMHG

## 2018-02-21 RX ADMIN — Medication SCH CAP: at 10:13

## 2018-02-21 RX ADMIN — KETOROLAC TROMETHAMINE SCH MG: 30 INJECTION, SOLUTION INTRAMUSCULAR at 01:20

## 2018-02-21 RX ADMIN — DOCUSATE SODIUM SCH MG: 100 CAPSULE, LIQUID FILLED ORAL at 10:13

## 2018-02-21 RX ADMIN — OXYCODONE AND ACETAMINOPHEN PRN TAB: 5; 325 TABLET ORAL at 12:09

## 2018-02-21 RX ADMIN — KETOROLAC TROMETHAMINE SCH MG: 30 INJECTION, SOLUTION INTRAMUSCULAR at 10:13

## 2018-02-21 RX ADMIN — IBUPROFEN SCH MG: 800 TABLET, FILM COATED ORAL at 00:11

## 2018-02-21 RX ADMIN — IBUPROFEN SCH MG: 800 TABLET, FILM COATED ORAL at 05:20

## 2018-02-21 RX ADMIN — IBUPROFEN SCH MG: 800 TABLET, FILM COATED ORAL at 12:08

## 2018-02-21 NOTE — PDOC DISCHARGE SUMMARY
Final Diagnosis


Discharge Date: 18





Discharge Data





- Discharge Medication


Prescriptions: 


Docusate Sodium [Colace 100 mg Capsule] 100 mg PO BID #60 capsule


Ferrous Sulfate 325 mg PO BID #60 tablet


Ibuprofen [Motrin 800 mg Tablet] 800 mg PO Q6 #60 tablet


Oxycodone HCl/Acetaminophen [Percocet 5-325 mg Tablet] 2 tab PO Q4HP PRN #30 

tablet


 PRN Reason: 


Home Medications: 








Prenatal Vit/Iron Fum/Folic AC [Prenatal Tablet] 1 tab PO DAILY 18 


Docusate Sodium [Colace 100 mg Capsule] 100 mg PO BID #60 capsule 18 


Ferrous Sulfate 325 mg PO BID #60 tablet 18 


Ibuprofen [Motrin 800 mg Tablet] 800 mg PO Q6 #60 tablet 18 


Oxycodone HCl/Acetaminophen [Percocet 5-325 mg Tablet] 2 tab PO Q4HP PRN #30 

tablet 18 








Prenatal Procedures: NST


Intrapartum Procedure(s): : Low Cervical, Transverse





- Diagnosis Test


Laboratory: 


 











Temp Pulse Resp BP Pulse Ox


 


 97.9 F   83   20   131/73 H  100 


 


 18 07:46  18 07:46  18 07:46  18 07:46  18 07:46








 











  18





  19:15 07:03


 


RBC   3.84


 


Hgb   10.3 L


 


Hct   31.2 L


 


Urine Opiates Screen  NEGATIVE 














- Discharge information/Instructions


Discharge Activity: Balance Activity w/Rest, No Driving, No Lifting Over 10 

Pounds, No Lifting/Push/Pulling, Pelvic Rest, Slowly Increase Activity, No tub 

bath, Other - follow up with her doctor in [place] in one week for incision 

check


Discharge Diet: Regular


Disposition: HOME, SELF-CARE


Follow up with: [facility] - will follow up with her dr in 

[place] or [place] in one week for incision check


in: 1, Weeks - pt states she is following up in [facility]
